# Patient Record
(demographics unavailable — no encounter records)

---

## 2025-04-02 NOTE — CONSULT LETTER
[Dear  ___] : Dear  [unfilled], [Courtesy Letter:] : I had the pleasure of seeing your patient, [unfilled], in my office today. [Please see my note below.] : Please see my note below. [Consult Closing:] : Thank you very much for allowing me to participate in the care of this patient.  If you have any questions, please do not hesitate to contact me. [Sincerely,] : Sincerely, [FreeTextEntry3] : Obehioya Irumudomon, MD  of Pediatric Neurology Co-Director of Pediatric Neuromuscular Clinic [employer] at [facility]

## 2025-04-02 NOTE — PHYSICAL EXAM
[Well-appearing] : well-appearing [Normocephalic] : normocephalic [Anterior fontanel- Open] : anterior fontanel- open [Anterior fontanel- Soft] : anterior fontanel- soft [Anterior fontanel- Flat] : anterior fontanel- flat [No dysmorphic facial features] : no dysmorphic facial features [No ocular abnormalities] : no ocular abnormalities [Neck supple] : neck supple [Soft] : soft [No organomegaly] : no organomegaly [No abnormal neurocutaneous stigmata or skin lesions] : no abnormal neurocutaneous stigmata or skin lesions [Straight] : straight [No deformities] : no deformities [Alert] : alert [Pupils reactive to light] : pupils reactive to light [Turns to light] : turns to light [Tracks face, light or objects with full extraocular movements] : tracks face, light or objects with full extraocular movements [No facial asymmetry or weakness] : no facial asymmetry or weakness [No nystagmus] : no nystagmus [Responds to voice/sounds] : responds to voice/sounds [Midline tongue] : midline tongue [No fasciculations] : no fasciculations [Normal axial and appendicular muscle tone with symmetric limb movements] : normal axial and appendicular muscle tone with symmetric limb movements [Normal bulk] : normal bulk [Good  bilaterally] : good  bilaterally [No abnormal involuntary movements] : no abnormal involuntary movements [2+ biceps] : 2+ biceps [Knee jerks] : knee jerks [Ankle jerks] : ankle jerks [No ankle clonus] : no ankle clonus [Grasp] : grasp [Responds to touch and tickle] : responds to touch and tickle [de-identified] : no resp distress, no retractions  [de-identified] : moving all extremities symmetrically and against gravity [de-identified] : symmetric Jaime

## 2025-04-02 NOTE — ASSESSMENT
[FreeTextEntry1] : 47 day old with history of asymmetric arm movements, which has now resolved. Neurologic examination as above. No neurodiagnostic workup recommended.

## 2025-04-02 NOTE — REASON FOR VISIT
[Initial Consultation] : an initial consultation for [FreeTextEntry2] : asymmetric arm movements [Mother] : mother

## 2025-04-02 NOTE — HISTORY OF PRESENT ILLNESS
[FreeTextEntry1] : Presenting for initial evaluation of asymmetric arm movements  Born at 37 weeks via induced vaginal delivery due to pre-eclampsia. No complications during pregnancy or delivery. Received routine  care Born at [facility] - no documentation available at time of visit.  Per mother providers noted that right arm was less reactive, but still with volitional movement. No imaging performed.  Mother reports that arm movements have been symmetric for a few weeks PCP recommended evaluation by PT and Neurology.  PT evaluation 3 weeks ago - per mother he looked fine and told that PT was not necessary  Mother denies any acute concerns at today's visit Sleeping well, feeding well No abnormal movements

## 2025-04-02 NOTE — BIRTH HISTORY
[At ___ Weeks Gestation] : at [unfilled] weeks gestation [place] [Normal Vaginal Route] : by normal vaginal route [None] : there were no delivery complications [de-identified] : induced due to Preeclampsia